# Patient Record
Sex: FEMALE | Race: WHITE
[De-identification: names, ages, dates, MRNs, and addresses within clinical notes are randomized per-mention and may not be internally consistent; named-entity substitution may affect disease eponyms.]

---

## 2017-08-01 NOTE — USB
EXAMINATION TYPE: US breast aspiration single RT

 

DATE OF EXAM: 8/1/2017

 

CLINICAL HISTORY: N63 Breast mass.

 

TECHNIQUE: Ultrasound guided core biopsy of right breast.  

 

COMPARISON: Mammogram [facility], ultrasound report [facility].

 

FINDINGS: The procedure of ultrasound guided core biopsy was explained to the patient.  Benefits, alt
ernatives, and risks were discussed.  An informed consent was then obtained.  

 

The patient was placed in supine positioning for  imaging and for the procedure. The overlying skin w
as prepped and draped in usual sterile fashion.  Lidocaine was used as anesthetic into the skin and s
ubcutaneous tissue up to area of concern in the right breast.

 

Under ultrasound guidance, an 18-gauge needle was advanced into the hypoechoic lesion at the 8:00 pos
ition and cyst aspiration was performed. Following this, a biopsy clip was left in lesion.  No solid 
lesion for biopsy was identified. This area appears to correspond to the description in prior reports
.

 

The patient tolerated the procedure well without any immediate complication.  The patient was kept in
 the radiology department for short stay after the procedure and then discharged home in stable condi
tion.  

 

Post procedure mammogram was obtained for clip placement.

 

IMPRESSION: 

1. Successful, uncomplicated ultrasound guided cyst aspiration 8:00 right breast with clip placement.


 

Recommendations:

1. Recommendations are pending pathology results.

## 2018-06-01 ENCOUNTER — HOSPITAL ENCOUNTER (OUTPATIENT)
Dept: HOSPITAL 47 - RADUSWWP | Age: 78
Discharge: HOME | End: 2018-06-01
Payer: MEDICARE

## 2018-06-01 DIAGNOSIS — R92.8: Primary | ICD-10-CM

## 2018-06-01 NOTE — USB
Reason for exam: clinical finding.



History:

Benign US breast aspiration single RT of the right breast, August 1, 2017.



Physical Findings:

Nurse Summary: all soft, nodular, movable (nurse ts).



US Breast RT

Right complete breast ultrasound includes all four quadrants, the retroareolar 

region and axilla. Finding demonstrates a 0.5 x 0.3 x 0.7cm oval, cystic lesion at

8 o'clock, stable and was aspirated.



These results were verbally communicated with the patient and result sheet given 

to the patient on 6/1/18.





ASSESSMENT: Benign, BI-RAD 2



RECOMMENDATION:

Routine screening mammogram of both breasts.

(due now for mammogram)

## 2018-06-18 ENCOUNTER — HOSPITAL ENCOUNTER (OUTPATIENT)
Dept: HOSPITAL 47 - RADMAMWWP | Age: 78
Discharge: HOME | End: 2018-06-18
Payer: MEDICARE

## 2018-06-18 DIAGNOSIS — Z12.31: Primary | ICD-10-CM

## 2018-06-18 PROCEDURE — 77067 SCR MAMMO BI INCL CAD: CPT

## 2018-06-18 PROCEDURE — 77063 BREAST TOMOSYNTHESIS BI: CPT

## 2018-06-27 NOTE — MM
Reason for exam: screening  (asymptomatic).

Last mammogram was performed 11 months ago.



History:

Patient is postmenopausal and is nulliparous.

Benign US breast aspiration single RT of the right breast, August 1, 2017.



Physical Findings:

A clinical breast exam by your physician is recommended on an annual basis and 

results should be correlated with mammographic findings.



MG 3D Screening Mammo W/Cad

Bilateral CC and MLO view(s) were taken.

Prior study comparison: August 1, 2017, right breast MG diagnostic mammo RT wo 

CAD.

The breast tissue is heterogeneously dense. This may lower the sensitivity of 

mammography.

Finding: There are typically benign vascular, round calcifications in the right 

breast. Previous mammotome biopsy in the right breast. There is no discrete 

abnormality.





ASSESSMENT: Benign, BI-RAD 2



RECOMMENDATION:

Routine screening mammogram of both breasts in 1 year.